# Patient Record
Sex: FEMALE | Race: WHITE
[De-identification: names, ages, dates, MRNs, and addresses within clinical notes are randomized per-mention and may not be internally consistent; named-entity substitution may affect disease eponyms.]

---

## 2017-03-06 ENCOUNTER — HOSPITAL ENCOUNTER (OUTPATIENT)
Dept: HOSPITAL 45 - C.MAMM | Age: 52
Discharge: HOME | End: 2017-03-06
Attending: OBSTETRICS & GYNECOLOGY
Payer: COMMERCIAL

## 2017-03-06 DIAGNOSIS — N63: ICD-10-CM

## 2017-03-06 DIAGNOSIS — N64.89: ICD-10-CM

## 2017-03-06 DIAGNOSIS — Z12.31: Primary | ICD-10-CM

## 2017-03-07 NOTE — MAMMOGRAPHY REPORT
BILATERAL DIGITAL SCREENING MAMMOGRAM TOMOSYNTHESIS WITH CAD: 3/6/2017

CLINICAL HISTORY: Routine screening.  Patient has no complaints.  





TECHNIQUE:  Breast tomosynthesis in addition to standard 2D mammography was performed. Current study
 was also evaluated with a Computer Aided Detection (CAD) system.  



COMPARISON: Comparison is made to exams dated:  3/3/2016 mammogram, 5/22/2015 mammogram, 4/14/2014 m
ammogram, and 4/11/2013 mammogram - [facility].   



BREAST COMPOSITION:  The tissue of both breasts is extremely dense, which lowers the sensitivity of 
mammography.  



FINDINGS:  There is a possible 3.6 mm mass with associated architectural distortion in the 12:00 far
 posterior right breast, for which additional spot compression tomosynthesis HD views and possibly u
ltrasound are recommended.



No other suspicious mass, architectural distortion or cluster of microcalcifications is seen bilater
ally.  



IMPRESSION:  ACR BI-RADS CATEGORY 0: INCOMPLETE EVALUATION:  NEED ADDITIONAL IMAGING EVALUATION

The possible 3.6 mm mass with associated architectural distortion in the 12:00 right breast needs ad
ditional evaluation.  

The patient will be called to schedule an appointment.  





Approximately 10% of breast cancers are not detected with mammography. A negative mammographic repor
t should not delay biopsy if a clinically suggestive mass is present.



[place] M.D.          

ay/:3/6/2017 22:10:51  



Imaging Technologist: Ronda ALLAN(LAURA)(SUNIL)(BD), [facility]

letter sent: Addl Imaging 0  

BI-RADS Code: ACR BI-RADS Category 0: Incomplete Evaluation:  Need Additional Imaging Evaluation

## 2017-03-15 ENCOUNTER — HOSPITAL ENCOUNTER (OUTPATIENT)
Dept: HOSPITAL 45 - C.MAMM | Age: 52
Discharge: HOME | End: 2017-03-15
Attending: OBSTETRICS & GYNECOLOGY
Payer: COMMERCIAL

## 2017-03-15 DIAGNOSIS — N63: Primary | ICD-10-CM

## 2017-03-15 NOTE — MAMMOGRAPHY REPORT
UNILATERAL RIGHT DIGITAL DIAGNOSTIC MAMMOGRAM TOMOSYNTHESIS AND TARGETED RIGHT ULTRASOUND: 3/15/2017

CLINICAL HISTORY: Callback from screening mammogram for possible right breast mass and architectural
 distortion.  





TECHNIQUE:  Breast tomosynthesis in addition to standard 2D mammography was performed.  Spot olivia
suresh right CC and MLO 2-D and tomosynthesis images were obtained.



COMPARISON: Comparison is made to exams dated:  3/6/2017 mammogram, 5/22/2015 mammogram, 3/3/2016 ma
mmogram, 4/14/2014 mammogram, 4/11/2013 mammogram, and 4/2/2012 mammogram - [facility].   



BREAST COMPOSITION:  The tissue of the right breast is extremely dense, which lowers the sensitivity
 of mammography.  



FINDINGS:  The previously described possible mass and architectural distortion seen within the right
 12:00 breast does not persist on the spot compression views, with no suspicious mass or architectur
al distortion seen on the additional tomosynthesis images.



Targeted ultrasound was performed of the right 12 to 1:00 breast in the region of the mammographic f
indings.  Sonographically normal tissue is seen, without evidence of a mass or other suspicious sono
graphic abnormality.



IMPRESSION:  ACR BI-RADS CATEGORY 2: BENIGN, TARGETED ULTRASOUND ACR BI-RADS CATEGORY 2: BENIGN 

The possible mass and architectural distortion in the right 12:00 breast does not persist on the add
itional views, with no corresponding sonographic abnormality evident.  Findings are benign and most 
compatible with normal fibroglandular tissue.There is no mammographic or targeted sonographic eviden
ce of malignancy. A 1 year screening mammogram is recommended.  The patient has been verbally notifi
ed of the results.  





Approximately 10% of breast cancers are not detected with mammography. A negative mammographic repor
t should not delay biopsy if a clinically suggestive mass is present.



Harriett Polanco M.D.          

/:3/15/2017 13:51:13  



Imaging Technologist: Irma ALLAN(LAURA)(SUNIL), [facility]

letter sent: Normal 1/2  

BI-RADS Code: ACR BI-RADS Category 2: Benign  Ultrasound BI-RADS: ACR BI-RADS Category 2: Benign

## 2017-03-20 ENCOUNTER — HOSPITAL ENCOUNTER (OUTPATIENT)
Dept: HOSPITAL 45 - C.PAPS | Age: 52
Discharge: HOME | End: 2017-03-20
Attending: OBSTETRICS & GYNECOLOGY
Payer: COMMERCIAL

## 2017-03-20 DIAGNOSIS — Z12.4: Primary | ICD-10-CM

## 2017-07-21 ENCOUNTER — HOSPITAL ENCOUNTER (OUTPATIENT)
Dept: HOSPITAL 45 - C.LABBC | Age: 52
Discharge: HOME | End: 2017-07-21
Attending: INTERNAL MEDICINE
Payer: COMMERCIAL

## 2017-07-21 DIAGNOSIS — R00.2: ICD-10-CM

## 2017-07-21 DIAGNOSIS — E03.9: Primary | ICD-10-CM

## 2017-07-21 LAB
ANION GAP SERPL CALC-SCNC: 5 MMOL/L (ref 3–11)
BUN SERPL-MCNC: 12 MG/DL (ref 7–18)
BUN/CREAT SERPL: 16.2 (ref 10–20)
CALCIUM SERPL-MCNC: 9.2 MG/DL (ref 8.5–10.1)
CHLORIDE SERPL-SCNC: 106 MMOL/L (ref 98–107)
CHOLEST/HDLC SERPL: 3.6 {RATIO}
CO2 SERPL-SCNC: 27 MMOL/L (ref 21–32)
CREAT SERPL-MCNC: 0.73 MG/DL (ref 0.6–1.2)
EOSINOPHIL NFR BLD AUTO: 210 K/UL (ref 130–400)
GLUCOSE SERPL-MCNC: 89 MG/DL (ref 70–99)
GLUCOSE UR QL: 54 MG/DL
HCT VFR BLD CALC: 40.6 % (ref 37–47)
KETONES UR QL STRIP: 126 MG/DL
MCH RBC QN AUTO: 32.9 PG (ref 25–34)
MCHC RBC AUTO-ENTMCNC: 34.2 G/DL (ref 32–36)
MCV RBC AUTO: 96 FL (ref 80–100)
NITRITE UR QL STRIP: 63 MG/DL (ref 0–150)
PH UR: 193 MG/DL (ref 0–200)
PMV BLD AUTO: 10.8 FL (ref 7.4–10.4)
POTASSIUM SERPL-SCNC: 4.5 MMOL/L (ref 3.5–5.1)
RBC # BLD AUTO: 4.23 M/UL (ref 4.2–5.4)
SODIUM SERPL-SCNC: 138 MMOL/L (ref 136–145)
TSH SERPL-ACNC: 2.31 UIU/ML (ref 0.3–4.5)
VERY LOW DENSITY LIPOPROT CALC: 13 MG/DL
WBC # BLD AUTO: 5.25 K/UL (ref 4.8–10.8)

## 2017-12-21 ENCOUNTER — HOSPITAL ENCOUNTER (OUTPATIENT)
Dept: HOSPITAL 45 - C.LAB1850 | Age: 52
Discharge: HOME | End: 2017-12-21
Attending: INTERNAL MEDICINE
Payer: COMMERCIAL

## 2017-12-21 DIAGNOSIS — R00.0: ICD-10-CM

## 2017-12-21 DIAGNOSIS — R00.2: Primary | ICD-10-CM

## 2018-02-23 ENCOUNTER — HOSPITAL ENCOUNTER (OUTPATIENT)
Dept: HOSPITAL 45 - C.GI | Age: 53
Discharge: HOME | End: 2018-02-23
Attending: INTERNAL MEDICINE
Payer: COMMERCIAL

## 2018-02-23 VITALS — SYSTOLIC BLOOD PRESSURE: 95 MMHG | OXYGEN SATURATION: 99 % | DIASTOLIC BLOOD PRESSURE: 58 MMHG | HEART RATE: 66 BPM

## 2018-02-23 VITALS
BODY MASS INDEX: 21.31 KG/M2 | BODY MASS INDEX: 21.31 KG/M2 | BODY MASS INDEX: 21.31 KG/M2 | WEIGHT: 120.26 LBS | HEIGHT: 62.99 IN | WEIGHT: 120.26 LBS | HEIGHT: 62.99 IN

## 2018-02-23 VITALS — TEMPERATURE: 97.88 F

## 2018-02-23 DIAGNOSIS — Z80.0: ICD-10-CM

## 2018-02-23 DIAGNOSIS — K29.50: ICD-10-CM

## 2018-02-23 DIAGNOSIS — K21.9: Primary | ICD-10-CM

## 2018-02-23 NOTE — ANESTHESIOLOGY PROGRESS NOTE
Anesthesia Post Op Note


Date & Time


Feb 23, 2018 at 10:15





Vital Signs


Pain Intensity:  0





Vital Signs Past 12 Hours








  Date Time  Temp Pulse Resp B/P (MAP) Pulse Ox O2 Delivery O2 Flow Rate FiO2


 


2/23/18 10:07  66 20 95/58 (70) 99 Room Air  


 


2/23/18 09:53  67 20 97/48 (64) 98 Room Air  


 


2/23/18 09:38  68 20 87/45 (59) 98 Room Air  


 


2/23/18 08:37 36.6  20 115/67 (83) 100 Room Air  











Notes


Mental Status:  alert / awake / arousable, participated in evaluation


Pt Amnestic to Procedure:  Yes


Nausea / Vomiting:  adequately controlled


Pain:  adequately controlled


Airway Patency, RR, SpO2:  stable & adequate


BP & HR:  stable & adequate


Hydration State:  stable & adequate


Anesthetic Complications:  no major complications apparent

## 2018-02-23 NOTE — ENDO HISTORY AND PHYSICAL
History & Physical


Date of Service:


Feb 23, 2018.


Chief Complaint:


ACID REFLUX


Referring Physician:


DR. PEACE


History of Present Illness


53 yo female who presents for EGD secondary to GERD.





Past Surgical History


Hx Cardiac Surgery:  No


Hx Internal Defibrillator:  No


Hx Pacemaker:  No


Hx Abdominal Surgery:  No


Hx of Implantable Prosthesis:  No


Hx Post-Op Nausea and Vomiting:  No


Hx Cancer Surgery:  No


Hx Thoracic Surgery:  No


Hx Orthopedic:  No


Hx Urinary Tract Surgery:  No





Family History


Colon CA





Social History


Smoking Status:  Never Smoker


Hx Substance Use:  No


Hx Alcohol Use:  Yes (3-4 GLASSES OF WINE WEEKLY (CAN'T TOLERATE D/T REFLUX 

LATELY))





Allergies


Coded Allergies:  


     Amoxicillin (Verified  Allergy, Unknown, RASH/ITCHING, 2/13/18)





Current Medications





Reported Home Medications








 Medications  Dose


 Route/Sig


 Max Daily Dose Days Date Category


 


 Multivitamin


  (Multivitamins)


 Tab  1 Tab


 PO 3XWK


    2/13/18 Reported











Vital Signs


Weight (Kilograms):  54.55


Height (Feet):  5


Height (Inches):  3











  Date Time  Temp Pulse Resp B/P (MAP) Pulse Ox O2 Delivery O2 Flow Rate FiO2


 


2/23/18 08:37 36.6  20 115/67 (83) 100 Room Air  











Physical Exam


General Appearance:  WD/WN, no apparent distress


Respiratory/Chest:  


   Auscultation:  breath sounds normal


Cardiovascular:  


   Heart Auscultation:  RRR


Abdomen:  


   Bowel Sounds:  normal


   Inspection & Palpation:  soft, non-distended, no tenderness, guarding & 

rebound





Assessment and Plan


Assessment:


53 yo female who presents for EGD secondary to GERD.








Plan:


Proceed with EGD.

## 2018-02-23 NOTE — DISCHARGE INSTRUCTIONS
Endoscopy Patient Instructions


Date / Procedure(s) Performed


Feb 23, 2018.


EGD





Allergy Information


Coded Allergies:  


     Amoxicillin (Verified  Allergy, Unknown, RASH/ITCHING, 2/13/18)





Discharge Date / Findings


Feb 23, 2018.


Gastritis s/p biopsies





Medication Instructions


OK to resume all medications today as prescribed





Reported Home Medications








 Medications  Dose


 Route/Sig


 Max Daily Dose Days Date Category


 


 Multivitamin


  (Multivitamins)


 Tab  1 Tab


 PO 3XWK


    2/13/18 Reported











Provider Instructions





Activity Restrictions





-  No exercising or heavy lifting for 24 hours. 


-  Do not drink alcohol the day of the procedure.


-  Do not drive a car or operate machinery until the day after the procedure.


-  Do not make any important decisions or sign important papers in 24 hours 

after the procedure.





Following Day:





-  Return to full activity which may include returning to work/school.





Diet





Start your diet with liquids and light foods (jello, soup, juice, toast).  Then 

eat your usual diet if not nauseated.





Treatment For Common After Affects





For mild abdominal pain, bloating, or excessive gas:





-  Rest


-  Eat lightly


-  Lie on right side





Follow-Up Information


Follow-up with DR. PEACE as scheduled





Anesthesia Information





What You Should Know





You have had a procedure that required some medicine to reduce anxiety and 

discomfort. This treatment is called moderate sedation.  


After receiving the treatment, you may be sleepy, but you will be able to 

breathe on your own.  The effects of the treatment may last for several hours.








Follow these instructions along with Activity/Diet recommendations noted above:





*  Do NOT do anything where dizziness or clumsiness would be dangerous.





*  Rest quietly at home today, then you can be up and about tomorrow.





*  Have a responsible person stay with you the rest of today.





*  You may have had an I.V. today.  If so, you may take the dressing off later 

today.





Recommendations


 


Call your doctor if:





*  Trouble breathing 





*  Continuous vomiting for more than 24 hours








*  Temperature above 101 degrees





*  Severe abdominal pain or bloating





*  Pain not relieved by pain medicine ordered





*  There is increased drainage or redness from any incision





*  A large amount of rectal bleeding greater than 2-3 tablespoons. 


   (If you had a polyp/s removed or have hemorrhoids, a small amount of blood -


    from the rectum is to be expected.)





*  You have any unanswered questions or concerns.








IN THE EVENT OF A SERIOUS EMERGENCY, GO TO THE NEAREST EMERGENCY ROOM








       Your discharge instructions were prepared by provider Shubham Vaz.





 Patient Instructions Signature Page














Jerilyn Peñaloza 











Patient (or Guardian) Signature/Date:____________________________________ I 

have read and understand the instructions given to me by my caregivers.








Caregiver/RN/Doctor Signature/Date:____________________________________











The above-named patient and/or guardian has received patient instructions on 

this date.





























+  Original Patient Signature Page (only) stays with chart.  Please make copy 

for patient.

## 2018-02-23 NOTE — GI REPORT
Procedure Date: 2/23/2018 8:48 AM

Procedure:            Upper GI endoscopy

Indications:          Gastro-esophageal reflux disease

Medicines:            Monitored Anesthesia Care

Complications:        No immediate complications.

Estimated Blood Loss: Estimated blood loss: none.

Procedure:            Pre-Anesthesia Assessment:

                      - Prior to the procedure, a History and Physical was 

                      performed, and patient medications and allergies were 

                      reviewed. The patient's tolerance of previous 

                      anesthesia was also reviewed. The risks and benefits of 

                      the procedure and the sedation options and risks were 

                      discussed with the patient. All questions were 

                      answered, and informed consent was obtained. Prior 

                      Anticoagulants: The patient has taken no previous 

                      anticoagulant or antiplatelet agents. ASA Grade 

                      Assessment: II - A patient with mild systemic disease. 

                      After reviewing the risks and benefits, the patient was 

                      deemed in satisfactory condition to undergo the 

                      procedure.

                      After obtaining informed consent, the endoscope was 

                      passed under direct vision. Throughout the procedure, 

                      the patient's blood pressure, pulse, and oxygen 

                      saturations were monitored continuously. The scope was 

                      introduced through the mouth, and advanced to the 

                      second part of duodenum. The upper GI endoscopy was 

                      accomplished without difficulty. The patient tolerated 

                      the procedure well.

Findings:

     The esophagus was normal.

     Localized mild inflammation characterized by erythema was found in the 

     gastric antrum. Biopsies were taken with a cold forceps for histology.

     The examined duodenum was normal.

Impression:           - Normal esophagus.

                      - Gastritis. Biopsied.

                      - Normal examined duodenum.

Recommendation:       - Resume previous diet.

                      - Continue present medications.

                      - Await pathology results.

                      - Return to primary care physician as previously 

                      scheduled.

Shubham Vaz DO

2/23/2018 9:43:18 AM

This report has been signed electronically.

Note Initiated On: 2/23/2018 8:48 AM

     I attest to the content of the Intraoperative Record and orders 

     documented therein, exceptions below

## 2018-03-09 ENCOUNTER — HOSPITAL ENCOUNTER (OUTPATIENT)
Dept: HOSPITAL 45 - C.MAMM | Age: 53
Discharge: HOME | End: 2018-03-09
Attending: OBSTETRICS & GYNECOLOGY
Payer: COMMERCIAL

## 2018-03-09 DIAGNOSIS — Z12.31: Primary | ICD-10-CM

## 2018-03-09 NOTE — MAMMOGRAPHY REPORT
BILATERAL DIGITAL SCREENING MAMMOGRAM TOMOSYNTHESIS WITH CAD: 3/9/2018

CLINICAL HISTORY: Routine screening.  Patient has no complaints.  





TECHNIQUE:  Breast tomosynthesis in addition to standard 2D mammography was performed. Current study 
was also evaluated with a Computer Aided Detection (CAD) system.  



COMPARISON: Comparison is made to exams dated:  3/15/2017 ultrasound, 3/15/2017 mammogram, 3/6/2017 m
ammogram, 3/3/2016 ultrasound, 5/22/2015 mammogram, and 4/11/2013 mammogram - [facility]
enter.   



BREAST COMPOSITION:  The tissue of both breasts is extremely dense, which lowers the sensitivity of m
ammography.  



FINDINGS:  No suspicious masses, calcifications, or areas of architectural distortion are noted in ei
ther breast. There has been no significant interval change compared to prior exams.  A linear scar ma
rker denotes a scar on the right superior breast.





IMPRESSION:  ACR BI-RADS CATEGORY 2: BENIGN

There is no mammographic evidence of malignancy. A 1 year screening mammogram is recommended.  The pa
tient will receive written notification of the results.  





Approximately 10% of breast cancers are not detected with mammography. A negative mammographic report
 should not delay biopsy if a clinically suggestive mass is present.



Harriett Polanco M.D.          

/:3/9/2018 10:43:29  



Imaging Technologist: Irma Darby, [facility]

letter sent: Normal 1/2  

BI-RADS Code: ACR BI-RADS Category 2: Benign

## 2018-03-28 ENCOUNTER — HOSPITAL ENCOUNTER (OUTPATIENT)
Dept: HOSPITAL 45 - C.PAPS | Age: 53
Discharge: HOME | End: 2018-03-28
Attending: OBSTETRICS & GYNECOLOGY
Payer: COMMERCIAL

## 2018-03-28 DIAGNOSIS — Z12.4: Primary | ICD-10-CM

## 2018-08-02 ENCOUNTER — HOSPITAL ENCOUNTER (OUTPATIENT)
Dept: HOSPITAL 45 - C.LABBC | Age: 53
Discharge: HOME | End: 2018-08-02
Attending: INTERNAL MEDICINE
Payer: COMMERCIAL

## 2018-08-02 DIAGNOSIS — E03.9: Primary | ICD-10-CM

## 2018-08-02 DIAGNOSIS — Z13.220: ICD-10-CM

## 2018-08-02 DIAGNOSIS — Z13.1: ICD-10-CM

## 2018-08-02 LAB
ALT SERPL-CCNC: 25 U/L (ref 12–78)
AST SERPL-CCNC: 17 U/L (ref 15–37)
BUN SERPL-MCNC: 12 MG/DL (ref 7–18)
CALCIUM SERPL-MCNC: 8.5 MG/DL (ref 8.5–10.1)
CO2 SERPL-SCNC: 29 MMOL/L (ref 21–32)
CREAT SERPL-MCNC: 0.8 MG/DL (ref 0.6–1.2)
GLUCOSE SERPL-MCNC: 86 MG/DL (ref 70–99)
KETONES UR QL STRIP: 107 MG/DL
PH UR: 186 MG/DL (ref 0–200)
POTASSIUM SERPL-SCNC: 4.3 MMOL/L (ref 3.5–5.1)
SODIUM SERPL-SCNC: 138 MMOL/L (ref 136–145)

## 2018-08-06 ENCOUNTER — HOSPITAL ENCOUNTER (OUTPATIENT)
Dept: HOSPITAL 45 - C.GI | Age: 53
Discharge: HOME | End: 2018-08-06
Attending: INTERNAL MEDICINE
Payer: COMMERCIAL

## 2018-08-06 VITALS — TEMPERATURE: 96.98 F

## 2018-08-06 VITALS
HEIGHT: 62.52 IN | WEIGHT: 120.26 LBS | BODY MASS INDEX: 21.58 KG/M2 | WEIGHT: 120.26 LBS | BODY MASS INDEX: 21.58 KG/M2 | HEIGHT: 62.52 IN

## 2018-08-06 VITALS — OXYGEN SATURATION: 98 % | HEART RATE: 66 BPM | SYSTOLIC BLOOD PRESSURE: 104 MMHG | DIASTOLIC BLOOD PRESSURE: 74 MMHG

## 2018-08-06 DIAGNOSIS — Z12.11: Primary | ICD-10-CM

## 2018-08-06 DIAGNOSIS — Z88.1: ICD-10-CM

## 2018-08-06 DIAGNOSIS — Z80.0: ICD-10-CM

## 2018-08-06 DIAGNOSIS — Z86.010: ICD-10-CM

## 2018-08-06 DIAGNOSIS — K21.9: ICD-10-CM

## 2018-08-06 NOTE — ENDO HISTORY AND PHYSICAL
History & Physical


Date of Service:


Aug 6, 2018.


Chief Complaint:


FAMILY HX COLON CA


Referring Physician:


DR. PEACE


History of Present Illness


51 yo female who presents for Colonoscopy secondary to family history of colon 

cancer.





Past Surgical History


Hx Cardiac Surgery:  No


Hx Internal Defibrillator:  No


Hx Pacemaker:  No


Hx Abdominal Surgery:  No


Hx of Implantable Prosthesis:  No


Hx Post-Op Nausea and Vomiting:  No


Hx Cancer Surgery:  No


Hx Thoracic Surgery:  No


Hx Orthopedic:  No


Hx Urinary Tract Surgery:  No





Family History


Colon CA





Social History


Smoking Status:  Never Smoker


Hx Substance Use:  No


Hx Alcohol Use:  Yes (2-3 GLASSES OF WINE ABOUT 3-4X A WEEK)





Allergies


Coded Allergies:  


     Amoxicillin (Verified  Allergy, Unknown, RASH/ITCHING, 8/1/18)





Current Medications





Reported Home Medications








 Medications  Dose


 Route/Sig


 Max Daily Dose Days Date Category


 


 Multivitamin


  (Multivitamins)


 Tab  1 Tab


 PO 3XWK


    2/13/18 Reported











Vital Signs


Weight (Kilograms):  54.55


Height (Feet):  5


Height (Inches):  2.5





Physical Exam


General Appearance:  WD/WN, no apparent distress


Respiratory/Chest:  


   Auscultation:  breath sounds normal


Cardiovascular:  


   Heart Auscultation:  RRR


Abdomen:  


   Bowel Sounds:  normal


   Inspection & Palpation:  soft, non-distended, no tenderness, guarding & 

rebound





Assessment and Plan


Assessment:


51 yo female who presents for Colonoscopy secondary to family history of colon 

cancer.








Plan:


Proceed with colonoscopy.

## 2018-08-06 NOTE — GI REPORT
Patient Name: Jerilyn Peñaloza

Procedure Date: 8/6/2018 9:17 AM

MRN: [id number]

Account Number: [id number]

YOB: 1965

Admit Type: Outpatient

Age: 52

Gender: Female

Attending MD: Shubham Vaz DO

Procedure:            Colonoscopy

Providers:            Shubham Vaz DO

Referring MD:         Keith Tavares

Indications:          High risk colon cancer surveillance: Personal history 

                      of colonic polyps

Medicines:            Monitored Anesthesia Care

Complications:        No immediate complications.

Estimated Blood Loss: Estimated blood loss: none.

Procedure:            Pre-Anesthesia Assessment:

                      - Prior to the procedure, a History and Physical was 

                      performed, and patient medications and allergies were 

                      reviewed. The patient's tolerance of previous 

                      anesthesia was also reviewed. The risks and benefits of 

                      the procedure and the sedation options and risks were 

                      discussed with the patient. All questions were 

                      answered, and informed consent was obtained. Prior 

                      Anticoagulants: The patient has taken no previous 

                      anticoagulant or antiplatelet agents. ASA Grade 

                      Assessment: II - A patient with mild systemic disease. 

                      After reviewing the risks and benefits, the patient was 

                      deemed in satisfactory condition to undergo the 

                      procedure.

                      After I obtained informed consent, the scope was passed 

                      under direct vision. Throughout the procedure, the 

                      patient's blood pressure, pulse, and oxygen saturations 

                      were monitored continuously. The scope was introduced 

                      through the anus and advanced to the terminal ileum. 

                      The colonoscopy was performed without difficulty. The 

                      patient tolerated the procedure well. The quality of 

                      the bowel preparation was good. The terminal ileum, 

                      ileocecal valve, appendiceal orifice, and rectum were 

                      photographed.

Findings:

     The perianal and digital rectal examinations were normal.

     The entire examined colon appeared normal.

Impression:           - The entire examined colon is normal.

                      - No specimens collected.

Recommendation:       - Resume previous diet.

                      - Continue present medications.

                      - Repeat colonoscopy in 5 years for surveillance.

                      - Return to primary care physician as previously 

                      scheduled.

Shubham WILKERSONCharity DO [place]

8/6/2018 9:51:26 AM

This report has been signed electronically.

Note Initiated On: 8/6/2018 9:17 AM

Number of Addenda: 1

     I attest to the content of the Intraoperative Record and orders 

     documented therein, exceptions below

Addendum Number: 1   Addendum Date: 8/6/2018 4:50:54 PM

     Indication for this procedure should read: Family history of colon 

     cancer.

Shubham STEFFI DO [place]

8/6/2018 4:51:29 PM

This report has been signed electronically.



{[id number]}

## 2018-08-06 NOTE — ANESTHESIOLOGY PROGRESS NOTE
Anesthesia Post Op Note


Date & Time


Aug 6, 2018 at 10:07





Vital Signs


Pain Intensity:  0





Vital Signs Past 12 Hours








  Date Time  Temp Pulse Resp B/P (MAP) Pulse Ox O2 Delivery O2 Flow Rate FiO2


 


8/6/18 10:05  67 16 106/69 (81) 98 Room Air  


 


8/6/18 09:53  81 16 93/66 (75) 99 Room Air  


 


8/6/18 09:47  61 16 92/59 (70) 100 Room Air  


 


8/6/18 09:40  64 16 91/54 (66) 100 Room Air  


 


8/6/18 09:03 36.1 71 16 118/67 (84) 99 Room Air  











Notes


Mental Status:  alert / awake / arousable, participated in evaluation


Pt Amnestic to Procedure:  Yes


Nausea / Vomiting:  adequately controlled


Pain:  adequately controlled


Airway Patency, RR, SpO2:  stable & adequate


BP & HR:  stable & adequate


Hydration State:  stable & adequate


Anesthetic Complications:  no major complications apparent

## 2018-08-06 NOTE — DISCHARGE INSTRUCTIONS
Endoscopy Patient Instructions


Date / Procedure(s) Performed


Aug 6, 2018.


Colonoscopy





Allergy Information


Coded Allergies:  


     Amoxicillin (Verified  Allergy, Unknown, RASH/ITCHING, 8/1/18)





Discharge Date / Findings


Aug 6, 2018.


Normal colonoscopy





Medication Instructions


OK to resume all medications today as prescribed





Reported Home Medications








 Medications  Dose


 Route/Sig


 Max Daily Dose Days Date Category


 


 Multivitamin


  (Multivitamins)


 Tab  1 Tab


 PO 3XWK


    2/13/18 Reported











Provider Instructions





Activity Restrictions





-  No exercising or heavy lifting for 24 hours. 


-  Do not drink alcohol the day of the procedure.


-  Do not drive a car or operate machinery until the day after the procedure.


-  Do not make any important decisions or sign important papers in 24 hours 

after the procedure.





Following Day:





-  Return to full activity which may include returning to work/school.





Diet





Start your diet with liquids and light foods (jello, soup, juice, toast).  Then 

eat your usual diet if not nauseated.





Treatment For Common After Affects





For mild abdominal pain, bloating, or excessive gas:





-  Rest


-  Eat lightly


-  Lie on right side





Follow-Up Information


Follow-up with DR. PEACE as scheduled





Anesthesia Information





What You Should Know





You have had a procedure that required some medicine to reduce anxiety and 

discomfort. This treatment is called moderate sedation.  


After receiving the treatment, you may be sleepy, but you will be able to 

breathe on your own.  The effects of the treatment may last for several hours.








Follow these instructions along with Activity/Diet recommendations noted above:





*  Do NOT do anything where dizziness or clumsiness would be dangerous.





*  Rest quietly at home today, then you can be up and about tomorrow.





*  Have a responsible person stay with you the rest of today.





*  You may have had an I.V. today.  If so, you may take the dressing off later 

today.





Recommendations


 


Call your doctor if:





*  Trouble breathing 





*  Continuous vomiting for more than 24 hours








*  Temperature above 101 degrees





*  Severe abdominal pain or bloating





*  Pain not relieved by pain medicine ordered





*  There is increased drainage or redness from any incision





*  A large amount of rectal bleeding greater than 2-3 tablespoons. 


   (If you had a polyp/s removed or have hemorrhoids, a small amount of blood -


    from the rectum is to be expected.)





*  You have any unanswered questions or concerns.








IN THE EVENT OF A SERIOUS EMERGENCY, GO TO THE NEAREST EMERGENCY ROOM








       Your discharge instructions were prepared by provider Shubham Vaz.





 Patient Instructions Signature Page














Jerilyn Peñaloza 











Patient (or Guardian) Signature/Date:____________________________________ I 

have read and understand the instructions given to me by my caregivers.








Caregiver/RN/Doctor Signature/Date:____________________________________











The above-named patient and/or guardian has received patient instructions on 

this date.





























+  Original Patient Signature Page (only) stays with chart.  Please make copy 

for patient.